# Patient Record
Sex: FEMALE | Race: WHITE
[De-identification: names, ages, dates, MRNs, and addresses within clinical notes are randomized per-mention and may not be internally consistent; named-entity substitution may affect disease eponyms.]

---

## 2018-03-10 ENCOUNTER — HOSPITAL ENCOUNTER (EMERGENCY)
Dept: HOSPITAL 80 - FED | Age: 48
Discharge: HOME | End: 2018-03-10
Payer: COMMERCIAL

## 2018-03-10 VITALS
RESPIRATION RATE: 18 BRPM | SYSTOLIC BLOOD PRESSURE: 142 MMHG | DIASTOLIC BLOOD PRESSURE: 75 MMHG | OXYGEN SATURATION: 95 % | HEART RATE: 102 BPM

## 2018-03-10 VITALS — TEMPERATURE: 97.3 F

## 2018-03-10 DIAGNOSIS — F41.9: Primary | ICD-10-CM

## 2018-03-10 DIAGNOSIS — E86.9: ICD-10-CM

## 2018-03-10 LAB — PLATELET # BLD: 230 10^3/UL (ref 150–400)

## 2018-03-10 PROCEDURE — 99284 EMERGENCY DEPT VISIT MOD MDM: CPT

## 2018-03-10 PROCEDURE — 96361 HYDRATE IV INFUSION ADD-ON: CPT

## 2018-03-10 PROCEDURE — 96374 THER/PROPH/DIAG INJ IV PUSH: CPT

## 2018-03-10 PROCEDURE — G0480 DRUG TEST DEF 1-7 CLASSES: HCPCS

## 2018-03-10 NOTE — EDPHY
HPI/HX/ROS/PE/MDM


Narrative: 





CHIEF COMPLAINT:  Anxiety





HISTORY OF PRESENT ILLNESS:   The patient is a 49 y/o female with a history of 

schizoaffective disorder treated with loxapine complaining of anxiety. She was 

released from [facility] on Monday with an increased dose of loxapine and 

Vistaril. She has had continued anxiety since then. She reports taking extra 

loxapine as an attempt to decrease her anxiety. In addition, she has had 2 

beers today. She denies using marijuana or illicit drugs. She denies suicidal 

ideation, headache, nausea, or any other associated symptoms. She denies 

history of hypertension, diabetes, or other medical history. Her primary mental 

health provider is Gabbie Panchal. 





No fever, chills, chest pain, shortness of breath, palpitations, vomiting, 

diarrhea, urinary complaints, headache, lightheadedness. 





REVIEW OF SYSTEMS:


Aside from elements discussed in the HPI, a comprehensive 10-point review of 

systems was reviewed and is negative.





PAST MEDICAL HISTORY:   Schizoaffective disorder





SOCIAL HISTORY:   Degree in [employer], Mental health provider: Gabbie Panchal, lives in [place] 





VITAL SIGNS: Reviewed by me


GENERAL: Labile, overweight, pleasant, alcohol on her breath


HEENT: Atraumatic. Eyes: No icterus, no injection. Mouth: moist mucous 

membranes.  No erythema or lesions. Neck: supple with no adenopathy.


LUNGS: Clear to auscultation bilaterally, no wheezes, rhonchi or rales.


CARDIAC: Regular rate and rhythm, no rubs, murmurs or gallops.


ABDOMEN: Soft, nontender, nondistended, bowel sounds normal.


BACK:  No CVA tenderness.


EXTREMITIES: No trauma. No edema.  Range of motion is normal throughout.


NEURO: Alert and oriented,  grossly nonfocal.  


SKIN: Warm and dry, no rash.


PSYCHIATRIC: Agitated but cooperative, insightful as to condition.





ED Course: 





The patient presents with anxiety. She has a history of schizoaffective 

disorder with a recent increase in loxapine and addition of Vistaril. She has 

been experiencing anxiety for the past week and is requesting help. She has 

increased her loxapine and used alcohol to try and control her symptoms. She is 

agitated but insightful, requesting something to help. She denies any suicidal 

ideation. Plan for fluids and Ativan for symptom management. 





The patient improved with Ativan upon reexamination. Lab results largely 

unremarkable.  Urine tox negative.  Patient denies suicidal ideation, she is 

not gravely disabled, she has no homicidal ideation, she does not meet criteria 

for 72 hr mental health hold.  In fact, she is asking for help.  She was 

evaluated by Juan from [facility] given resources.  She does have an appointment with 

her therapist in 3 days, Tuesday.  She was given information regarding the walk-

in clinic.  She is comfortable being discharged.  She was given a short course 

of Ativan to help with her symptoms of anxiety until she was able to be seen by 

her therapist.  She was advised not to use increased doses of loxapine for her 

anxiety.


MDM: 





Differential diagnoses for the patient's symptom complex was considered 

including but not limited to anxiety, panic attack, alcohol intoxication, 

illicit drug use, agitation secondary to at antihistamines, depression with 

anxiety.





- Data Points


Laboratory Results: 


 Laboratory Results





 03/10/18 13:55 





 03/10/18 13:55 








Medications Given: 


 








Discontinued Medications





Sodium Chloride (Ns)  1,000 mls @ 0 mls/hr IV ONCE ONE; Wide Open


   PRN Reason: Protocol


   Stop: 03/10/18 13:58


   Last Admin: 03/10/18 14:19 Dose:  1,000 mls


Lorazepam (Ativan Injection)  1 mg IVP EDNOW ONE


   Stop: 03/10/18 13:58


   Last Admin: 03/10/18 14:20 Dose:  1 mg








General


Time Seen by Provider: 03/10/18 13:50


Initial Vital Signs: 


 Initial Vital Signs











Temperature (C)  36.3 C   03/10/18 13:27


 


Heart Rate  120 H  03/10/18 13:27


 


Respiratory Rate  18   03/10/18 13:27


 


Blood Pressure  140/99 H  03/10/18 13:27


 


O2 Sat (%)  97   03/10/18 13:27








 











O2 Delivery Mode               Room Air














Allergies/Adverse Reactions: 


 





No Known Allergies Allergy (Verified 03/10/18 13:30)


 








Home Medications: 














 Medication  Instructions  Recorded


 


LORazepam [Ativan (*)] 1 mg PO BID #10 tab 03/10/18


 


Loxapine  03/10/18


 


Vistaril  03/10/18














Departure





- Departure


Disposition: Home, Routine, Self-Care


Clinical Impression: 


 Anxiety





Condition: Good


Instructions:  Generalized Anxiety Disorder (ED)


Additional Instructions: 


Please follow up as previously scheduled with your therapist on Tuesday.





You have been given information regarding the walk-in clinic for Mental [facility] 

[facility] [place].


Referrals: 


gabbie panchal [Other] - As per Instructions


Prescriptions: 


LORazepam [Ativan (*)] 1 mg PO BID #10 tab


Report Scribed for: Erika Wright


Report Scribed by: Kelli Arguello


Date of Report: 03/10/18


Time of Report: 15:01


Physician Review and Approval Statement: Portions of this note were transcribed 

by a medical scribe.  I personally performed a history, physical exam, medical 

decision making, and confirmed accuracy of information the transcribed note.

## 2018-03-10 NOTE — ASMTCMCOM
CM Note

 

CM Note                       

Notes:

Asked to see pt by DARWIN Rossi. Per [place], pt received Ativan and was complaining of feeling dizzy. Pt 

does not have any friends or family to give her a ride home. Met w/ pt to discuss transportation 

options. Pt laying down resting when approached by CM; pt immediately sat up and said she was ready 


to go. Offered pt assistance to call [employer] for a [employer] taxi. Pt refused, stating she didn't want 

to wait. Pt reports having a bus pass and says she will just take the bus home. Pt reports feeling 

okay now, denies dizziness. Pt ready to leave. CM checked w/ DARWIN Rossi regarding paperwork and 

prescriptions - per [place], pt okay to leave. Pt requesting prescription for Valium, pt previously 

given prescription for Ativan. Per [place], unable to prescribe any additional medications. Pt walked 

to ER entrance; assisted pt to locate closest bus stop. CM available for any further issues or 

concerns.

 

Date Signed:  03/10/2018 04:37 PM

Electronically Signed By:Brittany Luong RN